# Patient Record
(demographics unavailable — no encounter records)

---

## 2025-07-17 NOTE — PHYSICAL EXAM
[General Appearance - Alert] : alert [General Appearance - In No Acute Distress] : in no acute distress [General Appearance - Well Nourished] : well nourished [Impaired Insight] : insight and judgment were intact [Affect] : the affect was normal [Person] : oriented to person [Place] : oriented to place [Motor Tone] : muscle tone was normal in all four extremities [Motor Strength] : muscle strength was normal in all four extremities [Involuntary Movements] : no involuntary movements were seen [Limited Balance] : the patient's balance was impaired

## 2025-07-17 NOTE — ASSESSMENT
[FreeTextEntry1] : Patient with above history and imaging. S/p Right Frontal  shunt with strata regular II valve, set at 2.  Presents with above history.  She is here for clearance for a colonoscopy to rule out rectal bleeding.  From the neurosurgical perspective I do not see any reason why she can proceed with that.  She will need medical clearance I believe as well and son is aware.  We discussed that she may benefit from physical therapy to assist with her gait training and maintain fall precautions  Plan: CT head w/o contrast 1 year 7/2026 We will try to obtain the records from [facility] for formal review patient is in agreement with the plan.  Maintain fall precautions in the interim.  All questions and concerns answered and addressed in detail to patient's complete satisfaction. Patient verbalized understanding and agreed to plan.

## 2025-07-17 NOTE — REASON FOR VISIT
[FreeTextEntry1] : THEODORE BURRELL is a 75 year old female who presents for post op neurosurgical evaluation of Right frontal ventriculoperitoneal shunt done 7/28/23. Shunt set at 2 Hospital Course: Discharge Date 01-Aug-2023 Admission Date 28-Jul-2023 75 year old female with H/O HTN, Dementia, dyslipidemia, Hypothyroidism recent c/o difficulty walking and w/ balance, evaluated by outpatient neurologist for NPH s/p LP and MRI, admitted for scheduled Right Frontal  shunt with strata regular II valve, now set at 2, with Laparoscopic Insertion of the peritoneal catheter by dr. Chance 7/28/23.  Patient presents in a wheelchair accompanied by her son and daughter. Per their reports she has had a little bit more trouble with the gait more of a shuffling component. She admits that to mild headaches on the top of her head. No nausea no vomiting no vision changes. She has not had a seizure. Last seen August 2023. Denies any falls or injuries. Per her family she ambulates independently with a very forward flexed gait. She presents for follow-up of her shunt today  8/30/2024 Presents for review of CT head for surveillance of  shunt. She continues to be unsteady with ambulation.  Interval history: July 17, 2025.  Ms. Theodore Burrell is a 77-year-old female presents today for neurosurgical follow-up accompanied by her son she is in a wheelchair she is here for status post  shunt and review of a CAT scan of the head.  In review of the CAT scan there is does not appear to be interval any interval change in the ventricles and the catheter is in good position.  Per the son she did sustain a fall on around Mother's Day and was hospitalized at [facility] for 3 days inpatient CT of the head revealed a small intracranial hemorrhage and she was discharged home she is a bit unsteady with her gait but she is able to ambulate independently.  In review of this most recent CAT scan there is no evidence of intracranial hemorrhage.  She is here for neurosurgical clearance to proceed with a colonoscopy for concerns of rectal bleeding.  She is not urinary incontinent but does wear a diaper when she is out or at bedtime for safety

## 2025-07-17 NOTE — DATA REVIEWED
[de-identified] : Date of Exam: 07/02/2025 R. Phys. Name: Barb Gallardo Minerva R. Phys. Address: [address] [place] [address], [id number] R. Phys. Phone: [phone] EXAM: CT-HEAD NON CONTRAST  HISTORY: Normal pressure hydrocephalus, status post  shunt    1 known previous CT or cardiac nuclear medicine studies found in the 12-month period prior to this exam.  COMPARISON: Head CT dated 08/14/2024  TECHNIQUE: Helical CT scanning of the head was performed without the administration of intravenous contrast. Coronal and sagittal reformatted images were done. This study was performed using automatic exposure control and an iterative reconstruction technique (radiation dose reduction software) to obtain a diagnostic image quality scan with patient dose as low as reasonably achievable. The mA and kV were adjusted according to patient size. The administered radiation dose was 1.323mSv.  FINDINGS: Brain: Redemonstrated right frontal brandee hole with right frontal approach ventriculostomy catheter terminating in the left frontal horn. Pericatheter lucency is similar in appearance.  Ventricular size is unchanged.  Age-appropriate involutional and mild to moderate microvascular ischemic change.  No acute hemorrhage,, midline shift or extra-axial collections are identified.   Extra-axial spaces: There are no extra-axial fluid collections.  Extracranial findings.  The orbits are not remarkable in appearance.  There is bilateral ethmoid and left sphenoid sinus mucosal thickening.  The tympanomastoid cavities free of acute disease  IMPRESSION:  Right frontal approach ventriculostomy catheter with unchanged ventricular size.  Age-appropriate involutional and mild-to-moderate microvascular ischemic change.  No change compared with prior study dated 08/14/2024.   ICD 10 -   Electronically Signed By: Harriet Dodson M.D., on 7/8/2025 7:44 AM   SIGNED BY: Harriet Dodson M.D. Ext [id number] 07/08/2025 07:44 AM  IMPRESSION:  Right frontal approach ventriculostomy catheter with unchanged ventricular size.  Age-appropriate involutional and mild-to-moderate microvascular ischemic change.  No change compared with prior study dated 08/14/2024.